# Patient Record
Sex: MALE | Race: WHITE | Employment: OTHER | URBAN - METROPOLITAN AREA
[De-identification: names, ages, dates, MRNs, and addresses within clinical notes are randomized per-mention and may not be internally consistent; named-entity substitution may affect disease eponyms.]

---

## 2023-12-20 RX ORDER — LAMOTRIGINE 150 MG/1
150 TABLET ORAL 2 TIMES DAILY
COMMUNITY

## 2023-12-20 RX ORDER — METOPROLOL TARTRATE 50 MG/1
50 TABLET, FILM COATED ORAL 2 TIMES DAILY
COMMUNITY
End: 2024-01-19

## 2023-12-20 RX ORDER — TAMSULOSIN HYDROCHLORIDE 0.4 MG/1
0.4 CAPSULE ORAL NIGHTLY
COMMUNITY

## 2023-12-20 RX ORDER — CLOPIDOGREL BISULFATE 75 MG/1
75 TABLET ORAL DAILY
COMMUNITY

## 2023-12-20 RX ORDER — FUROSEMIDE 40 MG/1
40 TABLET ORAL PRN
COMMUNITY

## 2023-12-20 RX ORDER — WARFARIN SODIUM 2.5 MG/1
2.5 TABLET ORAL DAILY
COMMUNITY

## 2023-12-20 RX ORDER — LOVASTATIN 20 MG/1
20 TABLET ORAL NIGHTLY
COMMUNITY

## 2023-12-20 RX ORDER — POTASSIUM CHLORIDE 750 MG/1
10 CAPSULE, EXTENDED RELEASE ORAL PRN
COMMUNITY

## 2023-12-20 NOTE — PROGRESS NOTES
Mary, at [facility], notified pt.has no instructions regarding whether or not to stop blood thinners prior to colonoscopy.

## 2023-12-20 NOTE — PROGRESS NOTES
Patient reached __X__ yes  _____ no   VM instructions left ____ yes   phone number ________                                ____ no-office notified          Date _12/27/23________  Time __0900_____  Arrival __0730  hosp-endo____    Nothing to eat or drink after midnight-follow your doctors prep instructions-this may include taking a second dose of your prep after midnight  Responsible adult 18 or older to stay on site while you are here-drive you home-stay with you after  Follow any instructions your doctors office has given you  Bring a complete list of all your medications and supplements including name,dose,how often taken the day of your procedure  If you normally take the following medications in the morning please do so the AM of your procedure with a small sip of water       Heart,blood pressure,seizure,thyroid or breathing medications-use your inhalers-bring any rescue inhalers with you DOS       DO NOT take blood pressure medications ending in \"romana\" or \"pril\" the AM of procedure or evening prior  Dr Najera patients are not to take any medications the AM of surgery  Take half or your normal dose of any long acting insulins the night before your procedure-do not take any diabetic medications the AM of procedure  Follow your doctors instructions regarding stopping or taking  any blood thinners-if you do not have instructions-call them--CHECK PLAVIX/COUMADIN  Any questions call your doctor  Other  take metoprolol, lamictal am of procedure___________________________________________________________    VISITOR POLICY(subject to change)             The current policy is 2 visitors per patient.There are no children allowed.Mask at discretion of facility. Visiting hours are 8a-8p.Overnight visitors will be at the discretion of the nurse. All policies are subject to change.

## 2024-01-19 RX ORDER — METOPROLOL SUCCINATE 50 MG/1
50 TABLET, EXTENDED RELEASE ORAL 2 TIMES DAILY
COMMUNITY

## 2024-01-19 NOTE — PROGRESS NOTES
Patient Reached:  Yes___   No_X__    Voicemail Instructions Given: Yes___  No___  # Called: [phone]      Date: 2/8/2024      Arrival Time:  8:00 AM       Procedure Time:  9:30 AM      Location: [facility]. [place], [place]     -If you have not received your bowel prep kit and instructions, Please reach out to [facility].    -Please follow a Clear Liquid Diet the day prior to your procedure.    -No liquids after midnight. (Only water that is associated with the bowel prep)    -Responsible adult 18 or older to stay on site while you are here-drive you home-stay with you after    -Bring a complete list of all your medications and supplements including name,dose,how often taken the day of your procedure    -Follow any instructions your doctors office has given you regarding medications.    -If you normally take the following medications in the morning please do so the AM of your procedure with a small sip of water.         *Heart,blood pressure,seizure,thyroid or breathing medications-use your inhalers-bring any rescue inhalers with you DOS         *DO NOT take blood pressure medications ending in  \"romana\" or \"pril\"  the AM of procedure or evening prior    -Take half or your normal dose of any long acting insulins the night before your procedure-do not take any diabetic medications the AM of procedure    -Follow your doctors instructions regarding stopping or taking  any blood thinners-if you do not have instructions-call them    -Any questions call [facility]: [phone]    -Other:            VISITOR POLICY(subject to change)     *The current policy is 2 visitors per patient. There are no children allowed under the age of 14. Mask at discretion of facility. All policies are subject to change.

## 2024-02-08 ENCOUNTER — ANESTHESIA (OUTPATIENT)
Dept: ENDOSCOPY | Age: 69
End: 2024-02-08
Payer: MEDICARE

## 2024-02-08 ENCOUNTER — HOSPITAL ENCOUNTER (OUTPATIENT)
Age: 69
Setting detail: OUTPATIENT SURGERY
Discharge: HOME | End: 2024-02-08
Attending: INTERNAL MEDICINE | Admitting: INTERNAL MEDICINE
Payer: MEDICARE

## 2024-02-08 ENCOUNTER — ANESTHESIA EVENT (OUTPATIENT)
Dept: ENDOSCOPY | Age: 69
End: 2024-02-08
Payer: MEDICARE

## 2024-02-08 VITALS
HEIGHT: 71 IN | BODY MASS INDEX: 28.56 KG/M2 | SYSTOLIC BLOOD PRESSURE: 110 MMHG | DIASTOLIC BLOOD PRESSURE: 66 MMHG | OXYGEN SATURATION: 100 % | TEMPERATURE: 97.4 F | RESPIRATION RATE: 15 BRPM | HEART RATE: 62 BPM | WEIGHT: 204 LBS

## 2024-02-08 LAB
INR PPP: 1.13 (ref 0.84–1.16)
PROTHROMBIN TIME: 14.5 SEC (ref 11.5–14.8)

## 2024-02-08 PROCEDURE — 7100000001 HC PACU RECOVERY - ADDTL 15 MIN: Performed by: INTERNAL MEDICINE

## 2024-02-08 PROCEDURE — 2580000003 HC RX 258: Performed by: ANESTHESIOLOGY

## 2024-02-08 PROCEDURE — 3609010600 HC COLONOSCOPY POLYPECTOMY SNARE/COLD BIOPSY: Performed by: INTERNAL MEDICINE

## 2024-02-08 PROCEDURE — 2720000010 HC SURG SUPPLY STERILE: Performed by: INTERNAL MEDICINE

## 2024-02-08 PROCEDURE — 88305 TISSUE EXAM BY PATHOLOGIST: CPT

## 2024-02-08 PROCEDURE — 7100000011 HC PHASE II RECOVERY - ADDTL 15 MIN: Performed by: INTERNAL MEDICINE

## 2024-02-08 PROCEDURE — 7100000000 HC PACU RECOVERY - FIRST 15 MIN: Performed by: INTERNAL MEDICINE

## 2024-02-08 PROCEDURE — 6360000002 HC RX W HCPCS: Performed by: NURSE ANESTHETIST, CERTIFIED REGISTERED

## 2024-02-08 PROCEDURE — 2500000003 HC RX 250 WO HCPCS: Performed by: NURSE ANESTHETIST, CERTIFIED REGISTERED

## 2024-02-08 PROCEDURE — 7100000010 HC PHASE II RECOVERY - FIRST 15 MIN: Performed by: INTERNAL MEDICINE

## 2024-02-08 PROCEDURE — 85610 PROTHROMBIN TIME: CPT

## 2024-02-08 PROCEDURE — 3609019800 HC COLONOSCOPY WITH SUBMUCOSAL INJECTION: Performed by: INTERNAL MEDICINE

## 2024-02-08 PROCEDURE — 3700000001 HC ADD 15 MINUTES (ANESTHESIA): Performed by: INTERNAL MEDICINE

## 2024-02-08 PROCEDURE — 3700000000 HC ANESTHESIA ATTENDED CARE: Performed by: INTERNAL MEDICINE

## 2024-02-08 PROCEDURE — 36415 COLL VENOUS BLD VENIPUNCTURE: CPT

## 2024-02-08 PROCEDURE — 2709999900 HC NON-CHARGEABLE SUPPLY: Performed by: INTERNAL MEDICINE

## 2024-02-08 RX ORDER — PROPOFOL 10 MG/ML
INJECTION, EMULSION INTRAVENOUS PRN
Status: DISCONTINUED | OUTPATIENT
Start: 2024-02-08 | End: 2024-02-08 | Stop reason: SDUPTHER

## 2024-02-08 RX ORDER — SODIUM CHLORIDE 9 MG/ML
INJECTION, SOLUTION INTRAVENOUS CONTINUOUS
Status: DISCONTINUED | OUTPATIENT
Start: 2024-02-08 | End: 2024-02-08 | Stop reason: HOSPADM

## 2024-02-08 RX ORDER — PROPOFOL 10 MG/ML
INJECTION, EMULSION INTRAVENOUS CONTINUOUS PRN
Status: DISCONTINUED | OUTPATIENT
Start: 2024-02-08 | End: 2024-02-08 | Stop reason: SDUPTHER

## 2024-02-08 RX ORDER — LIDOCAINE HYDROCHLORIDE 20 MG/ML
INJECTION, SOLUTION INFILTRATION; PERINEURAL PRN
Status: DISCONTINUED | OUTPATIENT
Start: 2024-02-08 | End: 2024-02-08 | Stop reason: SDUPTHER

## 2024-02-08 RX ADMIN — PROPOFOL 40 MG: 10 INJECTION, EMULSION INTRAVENOUS at 09:36

## 2024-02-08 RX ADMIN — PROPOFOL 20 MG: 10 INJECTION, EMULSION INTRAVENOUS at 09:34

## 2024-02-08 RX ADMIN — SODIUM CHLORIDE: 9 INJECTION, SOLUTION INTRAVENOUS at 08:59

## 2024-02-08 RX ADMIN — PROPOFOL 40 MG: 10 INJECTION, EMULSION INTRAVENOUS at 09:38

## 2024-02-08 RX ADMIN — PROPOFOL 40 MG: 10 INJECTION, EMULSION INTRAVENOUS at 09:37

## 2024-02-08 RX ADMIN — PROPOFOL 150 MCG/KG/MIN: 10 INJECTION, EMULSION INTRAVENOUS at 09:28

## 2024-02-08 RX ADMIN — PROPOFOL 100 MG: 10 INJECTION, EMULSION INTRAVENOUS at 09:28

## 2024-02-08 RX ADMIN — LIDOCAINE HYDROCHLORIDE 100 MG: 20 INJECTION, SOLUTION INFILTRATION; PERINEURAL at 09:28

## 2024-02-08 ASSESSMENT — PAIN - FUNCTIONAL ASSESSMENT: PAIN_FUNCTIONAL_ASSESSMENT: NONE - DENIES PAIN

## 2024-02-08 NOTE — BRIEF OP NOTE
Brief Postoperative Note      Patient: Joselito Sue  YOB: 1955  MRN: [id number]    Date of Procedure: 2/8/2024    Pre-Op Diagnosis Codes:     * Positive FIT (fecal immunochemical test) [R19.5]     * Rectal bleeding [K62.5]     * Family history of colon cancer [Z80.0]        Procedure(s):  COLONOSCOPY POLYPECTOMY SNARE/COLD BIOPSY  COLONOSCOPY SUBMUCOSAL Spot ink Injection    Surgeon(s):  Jesus Mcdaniels MD      Anesthesia: Monitor Anesthesia Care    Estimated Blood Loss (mL): Minimal    Complications: None    Specimens:   ID Type Source Tests Collected by Time Destination   A : 40 mm polyp x1 Tissue Colon SURGICAL PATHOLOGY Jesus Mcdaniels MD 2/8/2024 0959      Findings:   Poor prep especially in the left colon.  Solid stool were clogging the scope.  Scattered diverticulosis, more extensive in the left colon.  At 40 cm from the anus, 15 mm pedunculated polyp, a Polyloop ligating device was placed at the polyp base.  The polyp was then removed with a hot snare.  2.5 mL of Spot dye was used to tressa the area.  Hemorrhoids.    Plans:  Await biopsies.  Repeat colonoscopy in 3 to 6 months with 2-day bowel prep.  Increase fiber intake to 30 g a day.  May use OTC Metamucil 1 tablespoon twice a day.    Electronically signed by Jesus Mcdaniels MD on 2/8/2024 at 10:11 AM

## 2024-02-08 NOTE — DISCHARGE INSTRUCTIONS
ENDOSCOPY DISCHARGE INSTRUCTIONS    You may experience some lightheadedness for the next several hours.  Plan on quiet relaxation for the rest of today.  A responsible adult needs to stay with you today.  Because of the medications you received today-do not drive,operate machinery,or sign any contractual agreement for the next 24 hours.  Do not drink any alcoholic beverages or take any unprescribed medications tonight.  Eat bland food and avoid anything greasy or spicy initially-progress to your normal diet gradually.  Diet restrictions as instructed.  You may resume home medications as instructed.  It is not unusual to experience some mild cramping or gas pains, and you may not have a bowel movement for several days.  If you have any of the following problems, notify your physician or return to the hospital emergency room : fever, chills, excessive bleeding, excessive vomiting, difficulty swallowing, uncontrolled pain, increased abdominal distention, shortness of breath or any other problems.  If you had a polyp removed, avoid strenuous activity for 48 hours.Avoid the use of aspirin or related compounds for one week, unless otherwise instructed by your physician.  You may notice a small amount of blood in your next few bowel movements, but if a large amount passes, call your physician.    Start blood thinners in 48 hours.  Repeat colonoscopy in 6 months.

## 2024-02-08 NOTE — ANESTHESIA POSTPROCEDURE EVALUATION
Department of Anesthesiology  Postprocedure Note    Patient: Joselito Sue  MRN: [id number]  YOB: 1955  Date of evaluation: 2/8/2024    Procedure Summary       Date: 02/08/24 Room / Location: [address]    Anesthesia Start: 0921 Anesthesia Stop: 1008    Procedures:       COLONOSCOPY POLYPECTOMY SNARE/COLD BIOPSY      COLONOSCOPY SUBMUCOSAL Spot ink Injection Diagnosis:       Positive FIT (fecal immunochemical test)      Rectal bleeding      Family history of colon cancer      (Positive FIT (fecal immunochemical test) [R19.5])      (Rectal bleeding [K62.5])      (Family history of colon cancer [Z80.0])    Surgeons: Jesus Mcdaniels MD Responsible Provider: Isaias Duff MD    Anesthesia Type: general, MAC ASA Status: 3            Anesthesia Type: No value filed.    Edie Phase I: Edie Score: 4    Edie Phase II:      Anesthesia Post Evaluation    Patient location during evaluation: PACU  Patient participation: complete - patient participated  Level of consciousness: awake  Airway patency: patent  Nausea & Vomiting: no vomiting and no nausea  Cardiovascular status: hemodynamically stable  Respiratory status: acceptable  Hydration status: stable  Multimodal analgesia pain management approach  Pain management: adequate    No notable events documented.

## 2024-02-08 NOTE — ANESTHESIA PRE PROCEDURE
Department of Anesthesiology  Preprocedure Note       Name:  Joselito Sue   Age:  68 y.o.  :  1955                                          MRN:  [id number]         Date:  2024      Surgeon: Surgeon(s):  Jessu Mcdaniels MD    Procedure: Procedure(s):  COLONOSCOPY DIAGNOSTIC    Medications prior to admission:   Prior to Admission medications    Medication Sig Start Date End Date Taking? Authorizing Provider   furosemide (LASIX) 40 MG tablet Take 1 tablet by mouth as needed (leg swelling)   Yes Negro Cabrales MD   potassium chloride (MICRO-K) 10 MEQ extended release capsule Take 1 capsule by mouth as needed Takes with lasix prn for leg swelling   Yes Negro Cabrales MD   clopidogrel (PLAVIX) 75 MG tablet Take 1 tablet by mouth daily   Yes Negro Cabrales MD   lovastatin (MEVACOR) 20 MG tablet Take 1 tablet by mouth nightly   Yes Negro Cabrales MD   warfarin (COUMADIN) 2.5 MG tablet Take 1 tablet by mouth daily   Yes Negro Cabrales MD   VORTIoxetine (TRINTELLIX) 5 MG tablet Take 2 tablets by mouth daily   Yes Negro Cabrales MD   lamoTRIgine (LAMICTAL) 150 MG tablet Take 1 tablet by mouth in the morning and at bedtime   Yes Negro Cabrales MD   tamsulosin (FLOMAX) 0.4 MG capsule Take 1 capsule by mouth at bedtime   Yes Negro Cabrales MD   metoprolol succinate (TOPROL XL) 50 MG extended release tablet Take 1 tablet by mouth 2 times daily    ProviderNegro MD       Current medications:    No current facility-administered medications for this encounter.       Allergies:  No Known Allergies    Problem List:  There is no problem list on file for this patient.      Past Medical History:        Diagnosis Date    Cancer (HCC)     prostate    Encounter for imaging to screen for metal prior to MRI 2023    [place] Sci Pacer NOT MRI SAFE  * Not MRI complete system* [employer] L311 Leads RV SJM 1646 SJM 1688TC *Not MRI safe*  -lfe    Hx of aortic valve

## 2024-02-08 NOTE — H&P
Pre-operative History and Physical    Patient: Joselito Sue  : 1955  Acct#:     History Obtained From:  patient    HISTORY OF PRESENT ILLNESS:    The patient is a 68 y.o. male with significant past medical history of +FIT test who presents with for colonoscopy.    Past Medical History:        Diagnosis Date    Cancer (HCC)     prostate    CHB (complete heart block) (HCC)     CKD (chronic kidney disease)     Encounter for imaging to screen for metal prior to MRI 2023    [place] Sci Pacer NOT MRI SAFE  * Not MRI complete system* [employer] L311 Leads RV SJM 1646 SJM 1688TC *Not MRI safe*  -lfe    Hx of aortic valve replacement     Hyperlipidemia     Seizures (HCC) 2023     Past Surgical History:        Procedure Laterality Date    COLONOSCOPY N/A 2024    COLONOSCOPY POLYPECTOMY SNARE/COLD BIOPSY performed by Jesus Mcdaniels MD at [facility] ENDOSCOPY    COLONOSCOPY  2024    COLONOSCOPY SUBMUCOSAL Spot ink Injection performed by Jesus Mcdaniels MD at [facility] ENDOSCOPY    CORONARY STENT PLACEMENT  2020    HERNANDEZ LAD    JOINT REPLACEMENT Bilateral     THR    PACEMAKER PLACEMENT       Medications Prior to Admission:   No current facility-administered medications on file prior to encounter.     Current Outpatient Medications on File Prior to Encounter   Medication Sig Dispense Refill    Enoxaparin Sodium (LOVENOX SC) Inject into the skin in the morning and at bedtime [place] for coumadin      furosemide (LASIX) 40 MG tablet Take 1 tablet by mouth as needed (leg swelling)      potassium chloride (MICRO-K) 10 MEQ extended release capsule Take 1 capsule by mouth as needed Takes with lasix prn for leg swelling      clopidogrel (PLAVIX) 75 MG tablet Take 1 tablet by mouth daily      lovastatin (MEVACOR) 20 MG tablet Take 1 tablet by mouth nightly      warfarin (COUMADIN) 2.5 MG tablet Take 1 tablet by mouth daily      VORTIoxetine (TRINTELLIX) 5 MG tablet Take 1 tablet by mouth daily       lamoTRIgine (LAMICTAL) 150 MG tablet Take 1 tablet by mouth in the morning and at bedtime      tamsulosin (FLOMAX) 0.4 MG capsule Take 1 capsule by mouth at bedtime      metoprolol succinate (TOPROL XL) 50 MG extended release tablet Take 1 tablet by mouth 2 times daily       Allergies:  Patient has no known allergies.    History of allergic reaction to anesthesia:  No    Social History:   U/R  Family History:   U/R    PHYSICAL EXAM:      /66   Pulse 62   Temp 97.4 °F (36.3 °C)   Resp 15   Ht 1.803 m (5' 11\")   Wt 92.5 kg (204 lb)   SpO2 100%   BMI 28.45 kg/m²  I        Heart:  Normal apical impulse, regular rate and rhythm, normal S1 and S2, no S3 or S4, and no murmur noted    Lungs:  No increased work of breathing, good air exchange, clear to auscultation bilaterally, no crackles or wheezing    Abdomen:  No scars, normal bowel sounds, soft, non-distended, non-tender, no masses palpated, no hepatosplenomegally      ASA Grade:  ASA 2 - Patient with mild systemic disease with no functional limitations    Mallampati Class:  Class I: Soft palate, uvula, fauces, pillars visible  __________  Class II: Soft palate, uvula, fauces visible  __________   Class III: Soft palate, base of uvula visible  _____X_____  Class IV: Hard palate only visible   __________      ASSESSMENT AND PLAN:    1.  Patient is a 68 y.o. male here for COLONOSCOPY with deep sedation  2.  Procedure options, risks and benefits reviewed with patient.  Patient expresses understanding.      Jesus Mcdaniels MD  [facility]  02/08/24

## 2024-05-15 ENCOUNTER — OFFICE VISIT (OUTPATIENT)
Age: 69
End: 2024-05-15

## 2024-05-15 VITALS
HEART RATE: 60 BPM | HEIGHT: 71 IN | WEIGHT: 214 LBS | TEMPERATURE: 98.5 F | DIASTOLIC BLOOD PRESSURE: 62 MMHG | SYSTOLIC BLOOD PRESSURE: 105 MMHG | OXYGEN SATURATION: 97 % | BODY MASS INDEX: 29.96 KG/M2

## 2024-05-15 DIAGNOSIS — S99.921A INJURY OF RIGHT FOOT, INITIAL ENCOUNTER: Primary | ICD-10-CM
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
## (undated) DEVICE — GOWN AURORA NONREINF LG: Brand: MEDLINE INDUSTRIES, INC.

## (undated) DEVICE — Device: Brand: DISPOSABLE ELECTROSURGICAL SNARE

## (undated) DEVICE — AIR/WATER CLEANING ADAPTER FOR OLYMPUS® GI ENDOSCOPE: Brand: BULLDOG®

## (undated) DEVICE — ENDOSCOPIC KIT 6X3/16 FT COLON W/ 1.1 OZ 2 GWN W/O BRSH

## (undated) DEVICE — TRAP SPEC RETRV CLR PLAS POLYP IN LN SUCT QUIK CTCH

## (undated) DEVICE — ELECTRODE PT RET AD L9FT HI MOIST COND ADH HYDRGEL CORDED

## (undated) DEVICE — SOLUTION IV IRRIG WATER 500ML POUR BRL ST 2F7113

## (undated) DEVICE — NEEDLE 25GAX5.0MM INJ CARR LOCKE

## (undated) DEVICE — VALVE SUCTION AIR H2O SET ORCA POD + DISP

## (undated) DEVICE — BW-412T DISP COMBO CLEANING BRUSH: Brand: SINGLE USE COMBINATION CLEANING BRUSH

## (undated) DEVICE — SINGLE USE LIGATING DEVICE: Brand: SINGLE USE LIGATING DEVICE